# Patient Record
Sex: FEMALE
[De-identification: names, ages, dates, MRNs, and addresses within clinical notes are randomized per-mention and may not be internally consistent; named-entity substitution may affect disease eponyms.]

---

## 2018-04-20 ENCOUNTER — HOSPITAL ENCOUNTER (EMERGENCY)
Dept: HOSPITAL 97 - ER | Age: 43
Discharge: HOME | End: 2018-04-20
Payer: SELF-PAY

## 2018-04-20 DIAGNOSIS — H81.399: Primary | ICD-10-CM

## 2018-04-20 LAB
BUN BLD-MCNC: 9 MG/DL (ref 6–20)
GLUCOSE SERPLBLD-MCNC: 101 MG/DL (ref 65–120)
HCT VFR BLD CALC: 40.4 % (ref 36–45)
LYMPHOCYTES # SPEC AUTO: 2.1 K/UL (ref 0.7–4.9)
MAGNESIUM SERPL-MCNC: 1.9 MG/DL (ref 1.8–2.5)
MCH RBC QN AUTO: 26.5 PG (ref 27–35)
MCV RBC: 80.2 FL (ref 80–100)
PMV BLD: 8.6 FL (ref 7.6–11.3)
POTASSIUM SERPL-SCNC: 4.1 MEQ/L (ref 3.6–5)
RBC # BLD: 5.04 M/UL (ref 3.86–4.86)

## 2018-04-20 PROCEDURE — 96374 THER/PROPH/DIAG INJ IV PUSH: CPT

## 2018-04-20 PROCEDURE — 85025 COMPLETE CBC W/AUTO DIFF WBC: CPT

## 2018-04-20 PROCEDURE — 80048 BASIC METABOLIC PNL TOTAL CA: CPT

## 2018-04-20 PROCEDURE — 96375 TX/PRO/DX INJ NEW DRUG ADDON: CPT

## 2018-04-20 PROCEDURE — 36415 COLL VENOUS BLD VENIPUNCTURE: CPT

## 2018-04-20 PROCEDURE — 96361 HYDRATE IV INFUSION ADD-ON: CPT

## 2018-04-20 PROCEDURE — 99284 EMERGENCY DEPT VISIT MOD MDM: CPT

## 2018-04-20 PROCEDURE — 83735 ASSAY OF MAGNESIUM: CPT

## 2018-04-20 NOTE — EDPHYS
Physician Documentation                                                                           

 [facility]                                                                

Name: Marie Leyva                                                                            

Age: 43 yrs                                                                                       

Sex: Female                                                                                       

: 1975                                                                                   

MRN: [id number]                                                                                   

Arrival Date: 2018                                                                          

Time: 12:12                                                                                       

Account#: [id number]                                                                            

Bed 13                                                                                            

Private MD:                                                                                       

ED Physician Paco Goss                                                                       

HPI:                                                                                              

                                                                                             

17:11 This 43 yrs old  Female presents to ER via Ambulatory with complaints of        jr8 

      Dizziness, Nausea.                                                                          

17:11 The patient presents with dizziness. Onset: The symptoms/episode began/occurred         jr8 

      acutely, today. Context: occurred at home, occurred while the patient was asleep.           

      Modifying factors: The symptoms are alleviated by lying down, the symptoms are              

      aggravated by movement of head, standing up, changing position. Associated signs and        

      symptoms: Pertinent positives: nausea. Severity of symptoms: At their worst the             

      symptoms were moderate in the emergency department the symptoms are unchanged.              

      Patient's baseline: Neuro: alert and fully oriented, Motor: no deficits, Ambulation:        

      walks without assistance, Speech: normal. The patient has not experienced similar           

      symptoms in the past. The patient has not recently seen a physician.                        

                                                                                                  

OB/GYN:                                                                                           

12:40 LMP 4/10/2018                                                                           hb  

                                                                                                  

Historical:                                                                                       

- Allergies:                                                                                      

12:41 No Known Allergies;                                                                     hb  

- Home Meds:                                                                                      

12:41 None [Active];                                                                          hb  

- PMHx:                                                                                           

12:41 None;                                                                                   hb  

- PSHx:                                                                                           

12:41 None;                                                                                   hb  

                                                                                                  

- Immunization history:: Adult Immunizations up to date.                                          

- Social history:: Smoking status: Patient/guardian denies using tobacco.                         

                                                                                                  

                                                                                                  

ROS:                                                                                              

17:11 Eyes: Negative for injury, pain, redness, and discharge, ENT: Negative for injury,      jr8 

      pain, and discharge, Neck: Negative for injury, pain, and swelling, Cardiovascular:         

      Negative for chest pain, palpitations, and edema, Respiratory: Negative for shortness       

      of breath, cough, wheezing, and pleuritic chest pain, Back: Negative for injury and         

      pain, MS/Extremity: Negative for injury and deformity, Skin: Negative for injury, rash,     

      and discoloration.                                                                          

17:11 Abdomen/GI: Positive for nausea, Negative for abdominal pain, vomiting, diarrhea,           

      abdominal cramps, abdominal distension, hematemesis, black/tarry stool, rectal pain,        

      rectal bleeding, bowel incontinence, flatulence.                                            

17:11 Neuro: Positive for dizziness, Negative for altered mental status, gait disturbance,        

      headache, hearing loss, loss of consciousness, numbness, seizure activity, speech           

      changes, syncope, near syncope, tingling, tinnitus, tremor, visual changes, weakness.       

                                                                                                  

Exam:                                                                                             

17:11 Eyes:  Pupils equal round and reactive to light, extra-ocular motions intact.  Lids and jr8 

      lashes normal.  Conjunctiva and sclera are non-icteric and not injected.  Cornea within     

      normal limits.  Periorbital areas with no swelling, redness, or edema. ENT:  Nares          

      patent. No nasal discharge, no septal abnormalities noted.  Tympanic membranes are          

      normal and external auditory canals are clear.  Oropharynx with no redness, swelling,       

      or masses, exudates, or evidence of obstruction, uvula midline.  Mucous membranes           

      moist. Neck:  Trachea midline, no thyromegaly or masses palpated, and no cervical           

      lymphadenopathy.  Supple, full range of motion without nuchal rigidity, or vertebral        

      point tenderness.  No Meningismus. Cardiovascular:  Regular rate and rhythm with a          

      normal S1 and S2.  No gallops, murmurs, or rubs.  Normal PMI, no JVD.  No pulse             

      deficits. Respiratory:  Lungs have equal breath sounds bilaterally, clear to                

      auscultation and percussion.  No rales, rhonchi or wheezes noted.  No increased work of     

      breathing, no retractions or nasal flaring. Abdomen/GI:  Soft, non-tender, with normal      

      bowel sounds.  No distension or tympany.  No guarding or rebound.  No evidence of           

      tenderness throughout. Back:  No spinal tenderness.  No costovertebral tenderness.          

      Full range of motion. Skin:  Warm, dry with normal turgor.  Normal color with no            

      rashes, no lesions, and no evidence of cellulitis. MS/ Extremity:  Pulses equal, no         

      cyanosis.  Neurovascular intact.  Full, normal range of motion. Neuro:  Awake and           

      alert, GCS 15, oriented to person, place, time, and situation.  Cranial nerves II-XII       

      grossly intact.  Motor strength 5/5 in all extremities.  Sensory grossly intact.            

      Cerebellar exam normal.  Normal gait.                                                       

                                                                                                  

Vital Signs:                                                                                      

12:40  / 64; Pulse 71; Resp 16; Temp 97.8; Pulse Ox 96% on R/A; Weight 108.41 kg;       hb  

      Height 5 ft. 4 in. (162.56 cm); Pain 0/10;                                                  

14:54  / 67; Pulse 68; Resp 16; Pulse Ox 98% on R/A;                                    aj  

12:40 Body Mass Index 41.02 (108.41 kg, 162.56 cm)                                            hb  

                                                                                                  

MDM:                                                                                              

12:44 Patient medically screened.                                                             [facility] 

14:35 Data reviewed: vital signs, nurses notes, lab test result(s), and as a result, I will   jr8 

      discharge patient. Data interpreted: Pulse oximetry: on room air is 96 %.                   

      Interpretation: normal. Counseling: I had a detailed discussion with the patient and/or     

      guardian regarding: the historical points, exam findings, and any diagnostic results        

      supporting the discharge/admit diagnosis, lab results, the need for outpatient follow       

      up, a family practitioner, to return to the emergency department if symptoms worsen or      

      persist or if there are any questions or concerns that arise at home. Response to           

      treatment: the patient's symptoms have markedly improved after treatment, patient is        

      well hydrated.                                                                              

                                                                                                  

                                                                                             

12:53 Order name: CBC with Diff; Complete Time: 13:22                                         [facility] 

                                                                                             

12:53 Order name: Basic Metabolic Panel; Complete Time: 14:02                                 [facility] 

                                                                                             

12:53 Order name: Magnesium; Complete Time: 14:02                                             [facility] 

                                                                                             

12:53 Order name: IV; Complete Time: 13:20                                                     

                                                                                                  

Administered Medications:                                                                         

13:21 Drug: NS 0.9% 1000 ml Route: IV; Rate: 1000 ml; Site: right antecubital;                aj  

14:57 Follow up: Response: No adverse reaction; IV Status: Completed infusion; IV Intake:     aj  

      1000ml                                                                                      

13:21 Drug: Valium 2 mg Route: IVP; Site: right antecubital;                                  aj  

14:57 Follow up: Response: Nausea is decreased                                                aj  

13:22 Drug: Meclizine 25 mg Route: PO;                                                        aj  

14:57 Follow up: Response: Marked relief of symptoms                                          aj  

13:22 Drug: Zofran 4 mg Route: IVP; Site: right antecubital;                                  aj  

14:56 Follow up: Response: No adverse reaction                                                aj  

14:56 Follow up: Response: Nausea is decreased                                                aj  

                                                                                                  

                                                                                                  

Disposition:                                                                                      

18 14:36 Discharged to Home. Impression: Other peripheral vertigo.                          

- Condition is Stable.                                                                            

                                                                                                  

- Prescriptions for Meclizine 25 mg Oral Tablet - take 1 tablet by ORAL route every 8             

  hours As needed; 30 tablet. Zofran 4 mg Oral Tablet - take 1 tablet by ORAL route               

  every 12 hours As needed; 20 tablet.                                                            

- Medication Reconciliation Form, Thank You Letter, Antibiotic Education, Prescription            

  Opioid Use form.                                                                                

- Follow up: Private Physician; When: 2 - 3 days; Reason: Recheck today's complaints,             

  Continuance of care, Re-evaluation by your physician.                                           

- Problem is new.                                                                                 

- Symptoms have improved.                                                                         

                                                                                                  

                                                                                                  

                                                                                                  

Addendum:                                                                                         

2018                                                                                        

     19:44 Co-signature as Attending Physician, Paco Goss MD I agree with the assessment and   k
dr

           plan of care.                                                                          

                                                                                                  

Signatures:                                                                                       

Dispatcher [employer]                           EDBrea Coles, RN                       RN   aj                                                   

Paco Goss MD MD   [facility]                                                  

Wiley Bone PA                        PA   jr8                                                  

Pallavi Olmedo RN                     RN                                                      

                                                                                                  

Corrections: (The following items were deleted from the chart)                                    

                                                                                             

15:00 14:36 2018 14:36 Discharged to Home. Impression: Other peripheral vertigo.        aj  

      Condition is Stable. Forms are Medication Reconciliation Form, Thank You Letter,            

      Antibiotic Education, Prescription Opioid Use. Follow up: Private Physician; When: 2 -      

      3 days; Reason: Recheck today's complaints, Continuance of care, Re-evaluation by your      

      physician. Problem is new. Symptoms have improved. jr8                                      

                                                                                                  

**************************************************************************************************

## 2018-04-20 NOTE — ER
Nurse's Notes                                                                                     

 [facility]                                                                

Name: Marie Leyva                                                                            

Age: 43 yrs                                                                                       

Sex: Female                                                                                       

: 1975                                                                                   

MRN: [id number]                                                                                   

Arrival Date: 2018                                                                          

Time: 12:12                                                                                       

Account#: [id number]                                                                            

Bed 13                                                                                            

Private MD:                                                                                       

Diagnosis: Other peripheral vertigo                                                               

                                                                                                  

Presentation:                                                                                     

                                                                                             

12:38 Presenting complaint: Patient states: Dizziness and nausea upon waking yesterday.       hb  

      Dizziness is worse with movement, better when supine. Denies V/D/fever. Transition of       

      care: patient was not received from another setting of care. Onset of symptoms was          

      2018. Care prior to arrival: None.                                                

12:38 Method Of Arrival: Ambulatory                                                           hb  

12:38 Acuity: GORAN 3                                                                           hb  

15:00 Initial Sepsis Screen: Does the patient meet any 2 criteria? No. Patient's initial      aj  

      sepsis screen is negative. Does the patient have a suspected source of infection? No.       

      Patient's initial sepsis screen is negative.                                                

                                                                                                  

OB/GYN:                                                                                           

12:40 LMP 4/10/2018                                                                           hb  

                                                                                                  

Historical:                                                                                       

- Allergies:                                                                                      

12:41 No Known Allergies;                                                                     hb  

- Home Meds:                                                                                      

12:41 None [Active];                                                                          hb  

- PMHx:                                                                                           

12:41 None;                                                                                   hb  

- PSHx:                                                                                           

12:41 None;                                                                                   hb  

                                                                                                  

- Immunization history:: Adult Immunizations up to date.                                          

- Social history:: Smoking status: Patient/guardian denies using tobacco.                         

                                                                                                  

                                                                                                  

Screenin:18 Abuse screen: Denies threats or abuse. Denies injuries from another. Nutritional        aj  

      screening: No deficits noted. Tuberculosis screening: No symptoms or risk factors           

      identified. Fall Risk None identified.                                                      

                                                                                                  

Assessment:                                                                                       

13:18 General: Appears in no apparent distress. comfortable, Behavior is calm, cooperative.   aj  

      Pain: Denies pain. Neuro: Level of Consciousness is awake, alert, obeys commands,           

      Oriented to person, place, time, situation,  are equal bilaterally Moves all           

      extremities. Full function Gait is steady, Speech is normal, Facial symmetry appears        

      normal, Reports dizziness. Respiratory: Airway is patent Respiratory effort is even,        

      unlabored, Respiratory pattern is regular, symmetrical. GI: Abdomen is non-distended,       

      obese, Reports nausea. Derm: Skin is intact, is healthy with good turgor, Skin is pink,     

      warm \T\ dry. normal.                                                                       

14:54 Reassessment: Patient appears in no apparent distress at this time. Patient and/or      aj  

      family updated on plan of care and expected duration. Pain level reassessed. Patient is     

      alert, oriented x 3, equal unlabored respirations, skin warm/dry/pink. Patient states       

      feeling better. Patient states symptoms have improved.                                      

                                                                                                  

Vital Signs:                                                                                      

12:40  / 64; Pulse 71; Resp 16; Temp 97.8; Pulse Ox 96% on R/A; Weight 108.41 kg;       hb  

      Height 5 ft. 4 in. (162.56 cm); Pain 0/10;                                                  

14:54  / 67; Pulse 68; Resp 16; Pulse Ox 98% on R/A;                                    aj  

12:40 Body Mass Index 41.02 (108.41 kg, 162.56 cm)                                            hb  

                                                                                                  

ED Course:                                                                                        

12:12 Patient arrived in ED.                                                                  na  

12:39 Triage completed.                                                                       hb  

12:40 Arm band placed on right wrist. EKG completed in triage. Results shown to MD.           hb  

12:44 iWley Bone PA is PHCP.                                                               [id number] 

12:44 Paco Goss MD is Attending Physician.                                              [id number] 

12:56 Brea Spring, RN is Primary Nurse.                                                     aj  

13:18 Placed in gown. Bed in low position. Side rails up X 1. Adult w/ patient. Pulse ox on.  aj  

      NIBP on.                                                                                    

13:18 Inserted saline lock: 20 gauge in right antecubital area, using aseptic technique.      aj  

      Blood collected.                                                                            

14:54 No provider procedures requiring assistance completed. IV discontinued, intact,         aj  

      bleeding controlled, No redness/swelling at site. Pressure dressing applied.                

                                                                                                  

Administered Medications:                                                                         

13:21 Drug: NS 0.9% 1000 ml Route: IV; Rate: 1000 ml; Site: right antecubital;                aj  

14:57 Follow up: Response: No adverse reaction; IV Status: Completed infusion; IV Intake:     aj  

      1000ml                                                                                      

13:21 Drug: Valium 2 mg Route: IVP; Site: right antecubital;                                  aj  

14:57 Follow up: Response: Nausea is decreased                                                aj  

13:22 Drug: Meclizine 25 mg Route: PO;                                                        aj  

14:57 Follow up: Response: Marked relief of symptoms                                          aj  

13:22 Drug: Zofran 4 mg Route: IVP; Site: right antecubital;                                  aj  

14:56 Follow up: Response: No adverse reaction                                                aj  

14:56 Follow up: Response: Nausea is decreased                                                aj  

                                                                                                  

                                                                                                  

Intake:                                                                                           

14:57 IV: 1000ml; Total: 1000ml.                                                              aj  

                                                                                                  

Outcome:                                                                                          

14:36 Discharge ordered by MD.                                                                roseanne 

14:55 Discharged to home ambulatory, with family.                                             aj  

14:55 Condition: good                                                                             

14:55 Discharge instructions given to patient, Instructed on discharge instructions, follow       

      up and referral plans. medication usage, Demonstrated understanding of instructions,        

      follow-up care, medications, Prescriptions given X 2.                                       

15:00 Patient left the ED.                                                                    aj  

                                                                                                  

Signatures:                                                                                       

Brea Spring, RN                       RN   Ebony Baez Josh, PA PA [id number] Baxter, Heather, RN                     RN                                                      

                                                                                                  

**************************************************************************************************